# Patient Record
Sex: MALE | Race: WHITE
[De-identification: names, ages, dates, MRNs, and addresses within clinical notes are randomized per-mention and may not be internally consistent; named-entity substitution may affect disease eponyms.]

---

## 2018-01-22 ENCOUNTER — HOSPITAL ENCOUNTER (EMERGENCY)
Dept: HOSPITAL 62 - ER | Age: 53
Discharge: ANOTHER HOSPITAL | End: 2018-01-22
Payer: COMMERCIAL

## 2018-01-22 VITALS — SYSTOLIC BLOOD PRESSURE: 166 MMHG | DIASTOLIC BLOOD PRESSURE: 105 MMHG

## 2018-01-22 DIAGNOSIS — I44.7: ICD-10-CM

## 2018-01-22 DIAGNOSIS — I45.6: Primary | ICD-10-CM

## 2018-01-22 DIAGNOSIS — R07.89: ICD-10-CM

## 2018-01-22 LAB
ADD MANUAL DIFF: NO
ALBUMIN SERPL-MCNC: 4.9 G/DL (ref 3.5–5)
ALP SERPL-CCNC: 76 U/L (ref 38–126)
ALT SERPL-CCNC: 59 U/L (ref 21–72)
ANION GAP SERPL CALC-SCNC: 11 MMOL/L (ref 5–19)
AST SERPL-CCNC: 30 U/L (ref 17–59)
BASOPHILS # BLD AUTO: 0 10^3/UL (ref 0–0.2)
BASOPHILS NFR BLD AUTO: 0.9 % (ref 0–2)
BILIRUB DIRECT SERPL-MCNC: 0.2 MG/DL (ref 0–0.4)
BILIRUB SERPL-MCNC: 0.5 MG/DL (ref 0.2–1.3)
BUN SERPL-MCNC: 12 MG/DL (ref 7–20)
CALCIUM: 10.4 MG/DL (ref 8.4–10.2)
CHLORIDE SERPL-SCNC: 105 MMOL/L (ref 98–107)
CK MB SERPL-MCNC: 1.25 NG/ML (ref ?–4.55)
CK SERPL-CCNC: 98 U/L (ref 55–170)
CO2 SERPL-SCNC: 26 MMOL/L (ref 22–30)
EOSINOPHIL # BLD AUTO: 0.1 10^3/UL (ref 0–0.6)
EOSINOPHIL NFR BLD AUTO: 1.7 % (ref 0–6)
ERYTHROCYTE [DISTWIDTH] IN BLOOD BY AUTOMATED COUNT: 12.7 % (ref 11.5–14)
GLUCOSE SERPL-MCNC: 106 MG/DL (ref 75–110)
HCT VFR BLD CALC: 45.5 % (ref 37.9–51)
HGB BLD-MCNC: 15.9 G/DL (ref 13.5–17)
LYMPHOCYTES # BLD AUTO: 2 10^3/UL (ref 0.5–4.7)
LYMPHOCYTES NFR BLD AUTO: 35 % (ref 13–45)
MAGNESIUM SERPL-MCNC: 2 MG/DL (ref 1.6–2.3)
MCH RBC QN AUTO: 32.5 PG (ref 27–33.4)
MCHC RBC AUTO-ENTMCNC: 35 G/DL (ref 32–36)
MCV RBC AUTO: 93 FL (ref 80–97)
MONOCYTES # BLD AUTO: 0.7 10^3/UL (ref 0.1–1.4)
MONOCYTES NFR BLD AUTO: 12.3 % (ref 3–13)
NEUTROPHILS # BLD AUTO: 2.9 10^3/UL (ref 1.7–8.2)
NEUTS SEG NFR BLD AUTO: 50.1 % (ref 42–78)
PLATELET # BLD: 214 10^3/UL (ref 150–450)
POTASSIUM SERPL-SCNC: 4.6 MMOL/L (ref 3.6–5)
PROT SERPL-MCNC: 7.6 G/DL (ref 6.3–8.2)
RBC # BLD AUTO: 4.89 10^6/UL (ref 4.35–5.55)
SODIUM SERPL-SCNC: 142.4 MMOL/L (ref 137–145)
TOTAL CELLS COUNTED % (AUTO): 100 %
TROPONIN I SERPL-MCNC: < 0.012 NG/ML
WBC # BLD AUTO: 5.7 10^3/UL (ref 4–10.5)

## 2018-01-22 PROCEDURE — 93005 ELECTROCARDIOGRAM TRACING: CPT

## 2018-01-22 PROCEDURE — 71045 X-RAY EXAM CHEST 1 VIEW: CPT

## 2018-01-22 PROCEDURE — 82550 ASSAY OF CK (CPK): CPT

## 2018-01-22 PROCEDURE — 36415 COLL VENOUS BLD VENIPUNCTURE: CPT

## 2018-01-22 PROCEDURE — 80053 COMPREHEN METABOLIC PANEL: CPT

## 2018-01-22 PROCEDURE — 85025 COMPLETE CBC W/AUTO DIFF WBC: CPT

## 2018-01-22 PROCEDURE — 83735 ASSAY OF MAGNESIUM: CPT

## 2018-01-22 PROCEDURE — 99285 EMERGENCY DEPT VISIT HI MDM: CPT

## 2018-01-22 PROCEDURE — 84484 ASSAY OF TROPONIN QUANT: CPT

## 2018-01-22 PROCEDURE — 93010 ELECTROCARDIOGRAM REPORT: CPT

## 2018-01-22 PROCEDURE — 82553 CREATINE MB FRACTION: CPT

## 2018-01-22 NOTE — ER DOCUMENT REPORT
ED General





- General


Chief Complaint: Chest Pain


Stated Complaint: CHEST PRESSURE


Time Seen by Provider: 01/22/18 11:53


Mode of Arrival: Ambulatory


Information source: Patient


Notes: 





52 yr old male hx of WPW presents with complaints of chest pain intermittnetly 

over the past few weeeks. Pt notes shortness of breath, pt went to pcp today, 

had EKG showing LBBB and sent in for evalution 


TRAVEL OUTSIDE OF THE [place] IN LAST 30 DAYS: No





- HPI


Onset: Other


Onset/Duration: Intermittent


Quality of pain: Pressure


Severity: Mild


Pain Level: 1


Associated symptoms: Chest pain


Exacerbated by: Other


Relieved by: Supine


Similar symptoms previously: Yes


Recently seen / treated by doctor: Yes





- Related Data


Allergies/Adverse Reactions: 


 





No Known Allergies Allergy (Verified 01/22/18 11:41)


 











Past Medical History





- Social History


Smoking Status: Never Smoker


Cigarette use (# per day): No


Chew tobacco use (# tins/day): No


Smoking Education Provided: No


Frequency of alcohol use: None


Drug Abuse: None


Family History: Reviewed & Not Pertinent


Patient has suicidal ideation: No


Patient has homicidal ideation: No





- Past Medical History


Cardiac Medical History: 


   Denies: Hx Coronary Artery Disease, Hx Heart Attack, Hx Hypertension


Pulmonary Medical History: 


   Denies: Hx Asthma, Hx Bronchitis, Hx COPD, Hx Pneumonia


Neurological Medical History: Denies: Hx Cerebrovascular Accident, Hx Seizures


Renal/ Medical History: Denies: Hx Peritoneal Dialysis


Musculoskeltal Medical History: Denies Hx Arthritis


Past Surgical History: Denies: Hx Pacemaker





Review of Systems





- Review of Systems


Notes: 





REVIEW OF SYSTEMS:


CONSTITUTIONAL :  Denies fever,  chills, or sweats.  Denies recent illness.


EENT:   Denies eye, ear, throat, or mouth pain or symptoms.  Denies nasal or 

sinus congestion or discharge.  Denies throat, tongue, or mouth swelling or 

difficulty swallowing.


CARDIOVASCULAR: Admits to chest pain


RESPIRATORY:  Denies cough, cold, or chest congestion.  Denies shortness of 

breath, difficulty breathing, or wheezing.


GASTROINTESTINAL:  Denies abdominal pain or distention.  Denies nausea, vomiting

, or diarrhea.  Denies blood in vomitus, stools, or per rectum.  Denies black, 

tarry stools.  Denies constipation.  


GENITOURINARY:  Denies difficulty urinating, painful urination, burning, 

frequency, blood in urine, or discharge.


MUSCULOSKELETAL:  Denies back or neck pain or stiffness.  Denies joint pain or 

swelling.


SKIN:   Denies rash, lesions or sores.


HEMATOLOGIC :   Denies easy bruising or bleeding.


LYMPHATIC:  Denies swollen, enlarged glands.


NEUROLOGICAL:  Denies confusion or altered mental status.  Denies passing out 

or loss of consciousness.  Denies dizziness or lightheadedness.  Denies 

headache.  Denies weakness or paralysis or loss of use of either side.  Denies 

problems with gait or speech.  Denies sensory loss, numbness, or tingling.  

Denies seizures.


PSYCHIATRIC:  Denies anxiety or stress.  Denies depression, suicidal ideation, 

or homicidal ideation.





ALL OTHER SYSTEMS REVIEWED AND NEGATIVE.





Dictation was performed using Dragon voice recognition software 





PHYSICAL EXAMINATION:





GENERAL: Well-appearing, well-nourished and in no acute distress.





HEAD: Atraumatic, normocephalic.





EYES: Pupils equal round and reactive to light, extraocular movements intact, 

sclera anicteric, conjunctiva are normal.





ENT: Nares patent, oropharynx clear without exudates.  Moist mucous membranes.





NECK: Normal range of motion, supple without lymphadenopathy





LUNGS: Breath sounds clear to auscultation bilaterally and equal.  No wheezes 

rales or rhonchi.





HEART: Regular rate and rhythm without murmurs





ABDOMEN: Soft, nontender, nondistended abdomen.  No guarding, no rebound.  No 

masses appreciated.





Musculoskeletal: Normal range of motion, no pitting or edema.  No cyanosis.





NEUROLOGICAL: Cranial nerves grossly intact.  Normal speech, normal gait.  

Normal sensory, motor exams 





PSYCH: Normal mood, normal affect.





SKIN: Warm, Dry, normal turgor, no rashes or lesions noted.





Physical Exam





- Vital signs


Vitals: 


 











Temp Pulse Resp BP Pulse Ox


 


 98.7 F   71   16   150/89 H  98 


 


 01/22/18 11:25  01/22/18 11:25  01/22/18 11:25  01/22/18 11:25  01/22/18 11:25














Course





- Re-evaluation


Re-evalutation: 





01/22/18 11:59


Patient's EKG was noted in the office does show left bundle branch block, was 

sent in and initial EKG at 11:35:09 notes left bundle branch block with a left 

bundle branch block resolves within 45 seconds, this occurs when he is chest 

pain-free, patient denies any episodes of WPW is not noted to be tachycardic, I 

did speak with Dr. Johnson who requests patient be obs


01/22/18 12:08


Dr Abdalla looked at ekg he has concerns of early preexitation WPW, JAZLYN stein EP 

consulted 


01/22/18 13:34


I spoke with transfer center, there EP physician Dr. Mann does not believe it 

is Nick-Parkinson-White syndrome, they requested their hospitalist to take the 

patient instead, Dr. Madison has accepted the patient





- Vital Signs


Vital signs: 


 











Temp Pulse Resp BP Pulse Ox


 


 98.7 F   71   17   161/97 H  100 


 


 01/22/18 11:25  01/22/18 11:25  01/22/18 12:01  01/22/18 12:01  01/22/18 12:01














- Laboratory


Result Diagrams: 


 01/22/18 12:00





 01/22/18 12:00


Laboratory results interpreted by me: 


 











  01/22/18





  12:00


 


Calcium  10.4 H














- Diagnostic Test


Radiology reviewed: Image reviewed, Reports reviewed





- EKG Interpretation by Me


EKG shows normal: Sinus rhythm, QRS Complexes, ST-T Waves


Axis/QRS: LBBB


When compared to previous EKG there are: Previous EKG unavailable


Additional EKG results interpreted by me: 





01/22/18 12:02


Initial EKG notes left bundle branch block secondary EKG just for a second 

later notes normal sinus rhythm





Discharge





- Discharge


Clinical Impression: 


 WPW (Nick-Parkinson-White syndrome)





Chest pain


Qualifiers:


 Chest pain type: unspecified Qualified Code(s): R07.9 - Chest pain, unspecified





Condition: Stable


Disposition: [facility]

## 2018-01-22 NOTE — RADIOLOGY REPORT (SQ)
EXAM DESCRIPTION:  CHEST SINGLE VIEW



COMPLETED DATE/TIME:  1/22/2018 12:12 pm



REASON FOR STUDY:  chest pain



COMPARISON:  CT chest 5/23/2011



EXAM PARAMETERS:  NUMBER OF VIEWS: One view.

TECHNIQUE: Single frontal radiographic view of the chest acquired.

RADIATION DOSE: NA

LIMITATIONS: None.



FINDINGS:  LUNGS AND PLEURA: No opacities, masses or pneumothorax. No pleural effusion.

MEDIASTINUM AND HILAR STRUCTURES: No masses.  Contour normal.

HEART AND VASCULAR STRUCTURES: Heart normal in size.  Normal vasculature.

BONES: No acute findings.

HARDWARE: None in the chest.

OTHER: No other significant finding.



IMPRESSION:  NO ACUTE RADIOGRAPHIC FINDING IN THE CHEST.



TECHNICAL DOCUMENTATION:  JOB ID:  [id number]

 2011 [employer]- All Rights Reserved

## 2018-01-22 NOTE — EKG REPORT
SEVERITY:- ABNORMAL ECG -

ACCELERATED JUNCTIONAL RHYTHM VS PACED BEATS

LEFT BUNDLE BRANCH BLOCK

:

Confirmed by: Andrea Virgen 22-Jan-2018 18:36:00

## 2018-01-23 NOTE — EKG REPORT
SEVERITY:- BORDERLINE ECG -

SINUS RHYTHM

PROBABLE LEFT ATRIAL ABNORMALITY

:

Confirmed on behalf of: Andrea Virgen 23-Jan-2018 08:19:57

## 2019-11-12 ENCOUNTER — HOSPITAL ENCOUNTER (OUTPATIENT)
Dept: HOSPITAL 62 - RAD | Age: 54
End: 2019-11-12
Payer: COMMERCIAL

## 2019-11-12 DIAGNOSIS — M23.204: Primary | ICD-10-CM

## 2019-11-12 NOTE — RADIOLOGY REPORT (SQ)
EXAM DESCRIPTION:  MRI LT LOWER JOINT WITHOUT



COMPLETED DATE/TIME:  11/12/2019 2:16 pm



REASON FOR STUDY:  M23.92 UNSPECIFIED INTERNAL DERANGEMENT OF LEFT KNEE M23.92  UNSPECIFIED INTERNAL 
DERANGEMENT OF LEFT KNEE



COMPARISON:  None.



TECHNIQUE:  Leftknee images acquired and stored on PACS.  Multiplanar images include fat sensitive se
quences as T1, water sensitive sequences as FST2 or STIR, cartilage sensitive sequences as FSPD, and 
gradient echo sequences.



LIMITATIONS:  None.



FINDINGS:  JOINT AND BURSAE: No effusion.

BONE CORTEX AND MARROW: Mild edema in the lateral tibial plateau.  No evidence of depressed fracture.


ACL: Intact.

PCL: Intact.

MCL: Generally intact.  Mild edema superficial to the ligament, which otherwise looks normal.

LCL: Intact. No periligamentous edema or fluid.

MEDIAL MENISCUS: Tear in the posterior horn extending throughout the body, predominantly horizontal b
ut with mild complexity.  Slightly extruded appearance.

LATERAL MENISCUS: No tears. No abnormal signal.

MEDIAL COMPARTMENT: Chondral thinning.  No focal full-thickness defects.

LATERAL COMPARTMENT: No focal full-thickness defects, subchondral cysts or erosions.

PATELLA: Normal location.  No focal chondral defects.

EXTENSOR MECHANISM: Intact. Quadriceps and patella tendons normal.

SOFT TISSUES: Popliteal cyst with debris and loose bodies.  Appropriate vascular flow voids.

OTHER: No other significant finding.



IMPRESSION:

1. Contusion edema in the lateral tibia.  No history of trauma.  No depression of the articular surfa
ce.

2. Medial meniscus tear.

3. Low grade sprain medial meniscus.



TECHNICAL DOCUMENTATION:  JOB ID:  [id number]

 2011 [employer]- All Rights Reserved



Reading location - IP/workstation name: [email or web address]

## 2019-11-26 LAB
ADD MANUAL DIFF: NO
ANION GAP SERPL CALC-SCNC: 11 MMOL/L (ref 5–19)
BASOPHILS # BLD AUTO: 0.1 10^3/UL (ref 0–0.2)
BASOPHILS NFR BLD AUTO: 1.1 % (ref 0–2)
BUN SERPL-MCNC: 12 MG/DL (ref 7–20)
CALCIUM: 10 MG/DL (ref 8.4–10.2)
CHLORIDE SERPL-SCNC: 104 MMOL/L (ref 98–107)
CO2 SERPL-SCNC: 25 MMOL/L (ref 22–30)
EOSINOPHIL # BLD AUTO: 0.1 10^3/UL (ref 0–0.6)
EOSINOPHIL NFR BLD AUTO: 1.6 % (ref 0–6)
ERYTHROCYTE [DISTWIDTH] IN BLOOD BY AUTOMATED COUNT: 12.3 % (ref 11.5–14)
GLUCOSE SERPL-MCNC: 98 MG/DL (ref 75–110)
HCT VFR BLD CALC: 42.2 % (ref 37.9–51)
HGB BLD-MCNC: 15.1 G/DL (ref 13.5–17)
LYMPHOCYTES # BLD AUTO: 1.7 10^3/UL (ref 0.5–4.7)
LYMPHOCYTES NFR BLD AUTO: 36.2 % (ref 13–45)
MCH RBC QN AUTO: 33.1 PG (ref 27–33.4)
MCHC RBC AUTO-ENTMCNC: 35.7 G/DL (ref 32–36)
MCV RBC AUTO: 93 FL (ref 80–97)
MONOCYTES # BLD AUTO: 0.7 10^3/UL (ref 0.1–1.4)
MONOCYTES NFR BLD AUTO: 13.6 % (ref 3–13)
NEUTROPHILS # BLD AUTO: 2.3 10^3/UL (ref 1.7–8.2)
NEUTS SEG NFR BLD AUTO: 47.5 % (ref 42–78)
PLATELET # BLD: 254 10^3/UL (ref 150–450)
POTASSIUM SERPL-SCNC: 4.6 MMOL/L (ref 3.6–5)
RBC # BLD AUTO: 4.56 10^6/UL (ref 4.35–5.55)
TOTAL CELLS COUNTED % (AUTO): 100 %
WBC # BLD AUTO: 4.8 10^3/UL (ref 4–10.5)

## 2019-11-26 NOTE — EKG REPORT
SEVERITY:- ABNORMAL ECG -

ACCELERATED JUNCTIONAL RHYTHM

LEFT BUNDLE BRANCH BLOCK

:

Confirmed by: Andrea Virgen 26-Nov-2019 18:25:44

## 2019-12-05 ENCOUNTER — HOSPITAL ENCOUNTER (OUTPATIENT)
Dept: HOSPITAL 62 - OROUT | Age: 54
Discharge: HOME | End: 2019-12-05
Payer: COMMERCIAL

## 2019-12-05 VITALS — DIASTOLIC BLOOD PRESSURE: 88 MMHG | SYSTOLIC BLOOD PRESSURE: 138 MMHG
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
## 2019-12-05 DIAGNOSIS — M22.42: ICD-10-CM

## 2019-12-05 DIAGNOSIS — S83.242A: Primary | ICD-10-CM

## 2019-12-05 PROCEDURE — 93010 ELECTROCARDIOGRAM REPORT: CPT

## 2019-12-05 PROCEDURE — 36415 COLL VENOUS BLD VENIPUNCTURE: CPT

## 2019-12-05 PROCEDURE — 29880 ARTHRS KNE SRG MNISECTMY M&L: CPT

## 2019-12-05 PROCEDURE — 85025 COMPLETE CBC W/AUTO DIFF WBC: CPT

## 2019-12-05 PROCEDURE — 93005 ELECTROCARDIOGRAM TRACING: CPT

## 2019-12-05 PROCEDURE — 80048 BASIC METABOLIC PNL TOTAL CA: CPT

## 2019-12-05 NOTE — OPERATIVE REPORT
Operative Report


DATE OF SURGERY: 12/05/19


PREOPERATIVE DIAGNOSIS: Left medial meniscus tear


POSTOPERATIVE DIAGNOSIS: Left medial meniscus tear, patellofemoral 

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


OPERATION: Left knee arthroscopic partial medial meniscectomy


SURGEON: VAISHNAVI DENT JR


ANESTHESIA: GA


COMPLICATIONS: 





none


ESTIMATED BLOOD LOSS: minimal


PROCEDURE: 





DESCRIPTION OF THE PROCEDURE: The patient was placed supine on the operating 

room table.  After the patient was placed under general anesthesia, and appro

priate timeout was performed.  The patient was prepped and draped in the usual 

sterile fashion for arthroscopic surgery.  The left lower extremity was then 

exsanguinated with the use of an Esmarch bandage and the tourniquet was inflated

to 280 mmHg.





The operation commenced with creation of the lateral portal utilizing his prior 

incision. The arthroscope was directed into the suprapatellar pouch with the 

knee held in extension. A systematic examination of the left knee was begun 

arthroscopically. The patellofemoral articulation was visualized and grade 4 

changes were apparent in the trochlea but only minimal degenerative changes were

found on the patella surface.  The medial gutter was entered. No loose bodies 

were identified.   The medial compartment was then entered and the medial portal

was established under direct visualization with a spinal needle. The 

arthroscopic probe was used to inspect the contents of the medial compartment.  

There was a degenerative horizontal cleavage tear with some extrusion into the 

joint that carried from the mid coronal plane all the way back to the root.  In 

the mid coronal plane there was a nearly complete radial tear.  At this point, a

biter was utilized to remove the unstable portion of the medial meniscus, this 

was resected to a stable base and a suction shaver was then utilized to remove 

debris.  The notch was then visualized. The anterior cruciate ligament and PCL 

were found to be intact.  The arthroscope was directed into the lateral 

compartment.  There was moderate meniscal fraying without an overt tear of the 

lateral meniscus.  Shaver was utilized to remove the frayed edge of the lateral 

meniscus.  A full-thickness fissure was found on the medial aspect of the 

lateral tibial plateau running from anterior to posterior.  This was probed and 

the cartilage surrounding was found to be stable without delamination.  Femoral 

condyle was found to be intact without signs of degenerative change in the 

lateral compartment.   Portals were exchanged and the medial part of the medial 

meniscectomy was completed.  The patellofemoral compartment was revisited and 

grade 4 changes were documented.





A mixture of Marcaine, lidocaine, toradol and Kenalog was injected into the 

right knee.  The instruments were then removed. The portals were closed with 3-0

nylon and Xeroform and a light compressive dressing was applied.  The tourniquet

was deflated.  The patient was recovered from his anesthetic and was returned to

the recovery room in stable condition. There were no complications.

## 2019-12-07 NOTE — DISCHARGE SUMMARY
Discharge Summary (SDC)





- Discharge


Final Diagnosis: 





Left medial meniscus tear. 


Date of Surgery: 12/05/19


Discharge Date: 12/05/19


Forms:  [facility] Anesthesia D/C Instruction, Discharge POC-Surgical Service


Treatment or Instructions: 


FOLLOWUP WITH DR. DENT AS SCHEDULED





Self-care: 


* Apply ice on your knee for 15 to 20 minutes every hour or as directed. Use an 

  ice pack, or put crushed ice in a plastic bag. Cover it with a towel. Ice 

  helps prevent tissue damage and decreases swelling and pain.


* Elevate your knee above the level of your heart as often as you can. This will

  help decrease swelling and pain. Prop your leg on pillows or blankets to keep 

  it elevated comfortably. Do not put pillows directly behind your knee. 


* Keep weight off of your knee


Seek care immediately if: 


* Blood soaks through your bandage.


* Your stitches come apart.


* Your leg feels numb or cold and looks pale. 


* Your leg is larger than normal, red and painful. 


* You cannot move your leg or foot. 


Contact your healthcare provider if: 


* You have a fever or chills.


* Your wound is red, swollen, or draining pus.


* You have severe pain in your knee even after you take pain medicine. 


* Your skin is itchy, swollen, or you have a rash.


Referrals: 


ROSARIO MCKEON MD [Primary Care Provider] - 


VAISHNAVI DENT JR, DO [ACTIVE PROVISIONAL STAFF] - 


Discharge Diet: As Tolerated


Respiratory Treatments at Home: Deep Breathing/Coughing


Discharge Activity: Activity As Tolerated, Balance Activity w/Rest, Keep Legs 

Elevated, No Lifting Over 10 Pounds, No Lifting/Push/Pulling, No tub bath


Home Care Assistance: None Needed


Report the Following to Your Physician Immediately: Shortness of Breath, Nausea,

Vomiting, Increase in Pain, Fever over 101 Degrees, Unusual Bleeding, Redness, 

Swelling, Warmth, Increased Soreness, Drainage-Yellow, Drainage-Gray, Drainage-

Green, Drainage-Foul Smelling, Large Clots, Numbness, Tingling Sensation, 

Wheezing, Seizure, IV Site Infection Signs, Urinary Infection Signs